# Patient Record
Sex: FEMALE | Race: WHITE | NOT HISPANIC OR LATINO
[De-identification: names, ages, dates, MRNs, and addresses within clinical notes are randomized per-mention and may not be internally consistent; named-entity substitution may affect disease eponyms.]

---

## 2018-09-25 ENCOUNTER — RESULT REVIEW (OUTPATIENT)
Age: 58
End: 2018-09-25

## 2018-11-30 ENCOUNTER — OUTPATIENT (OUTPATIENT)
Dept: OUTPATIENT SERVICES | Facility: HOSPITAL | Age: 58
LOS: 1 days | End: 2018-11-30
Payer: COMMERCIAL

## 2018-11-30 VITALS
RESPIRATION RATE: 16 BRPM | SYSTOLIC BLOOD PRESSURE: 113 MMHG | DIASTOLIC BLOOD PRESSURE: 76 MMHG | OXYGEN SATURATION: 97 % | HEIGHT: 60 IN | HEART RATE: 91 BPM | WEIGHT: 173.06 LBS | TEMPERATURE: 99 F

## 2018-11-30 DIAGNOSIS — N84.0 POLYP OF CORPUS UTERI: ICD-10-CM

## 2018-11-30 DIAGNOSIS — Z90.710 ACQUIRED ABSENCE OF BOTH CERVIX AND UTERUS: Chronic | ICD-10-CM

## 2018-11-30 DIAGNOSIS — Z01.818 ENCOUNTER FOR OTHER PREPROCEDURAL EXAMINATION: ICD-10-CM

## 2018-11-30 LAB
ANION GAP SERPL CALC-SCNC: 14 MMOL/L — SIGNIFICANT CHANGE UP (ref 5–17)
BUN SERPL-MCNC: 16 MG/DL — SIGNIFICANT CHANGE UP (ref 7–23)
CALCIUM SERPL-MCNC: 9.9 MG/DL — SIGNIFICANT CHANGE UP (ref 8.4–10.5)
CHLORIDE SERPL-SCNC: 103 MMOL/L — SIGNIFICANT CHANGE UP (ref 96–108)
CO2 SERPL-SCNC: 26 MMOL/L — SIGNIFICANT CHANGE UP (ref 22–31)
CREAT SERPL-MCNC: 0.87 MG/DL — SIGNIFICANT CHANGE UP (ref 0.5–1.3)
GLUCOSE SERPL-MCNC: 80 MG/DL — SIGNIFICANT CHANGE UP (ref 70–99)
HCT VFR BLD CALC: 43.7 % — SIGNIFICANT CHANGE UP (ref 34.5–45)
HGB BLD-MCNC: 14 G/DL — SIGNIFICANT CHANGE UP (ref 11.5–15.5)
MCHC RBC-ENTMCNC: 27.8 PG — SIGNIFICANT CHANGE UP (ref 27–34)
MCHC RBC-ENTMCNC: 32 GM/DL — SIGNIFICANT CHANGE UP (ref 32–36)
MCV RBC AUTO: 86.9 FL — SIGNIFICANT CHANGE UP (ref 80–100)
PLATELET # BLD AUTO: 326 K/UL — SIGNIFICANT CHANGE UP (ref 150–400)
POTASSIUM SERPL-MCNC: 4 MMOL/L — SIGNIFICANT CHANGE UP (ref 3.5–5.3)
POTASSIUM SERPL-SCNC: 4 MMOL/L — SIGNIFICANT CHANGE UP (ref 3.5–5.3)
RBC # BLD: 5.03 M/UL — SIGNIFICANT CHANGE UP (ref 3.8–5.2)
RBC # FLD: 14 % — SIGNIFICANT CHANGE UP (ref 10.3–14.5)
SODIUM SERPL-SCNC: 143 MMOL/L — SIGNIFICANT CHANGE UP (ref 135–145)
WBC # BLD: 10.68 K/UL — HIGH (ref 3.8–10.5)
WBC # FLD AUTO: 10.68 K/UL — HIGH (ref 3.8–10.5)

## 2018-11-30 PROCEDURE — 85027 COMPLETE CBC AUTOMATED: CPT

## 2018-11-30 PROCEDURE — 80048 BASIC METABOLIC PNL TOTAL CA: CPT

## 2018-11-30 PROCEDURE — G0463: CPT

## 2018-11-30 RX ORDER — SODIUM CHLORIDE 9 MG/ML
3 INJECTION INTRAMUSCULAR; INTRAVENOUS; SUBCUTANEOUS EVERY 8 HOURS
Qty: 0 | Refills: 0 | Status: DISCONTINUED | OUTPATIENT
Start: 2018-12-05 | End: 2018-12-20

## 2018-11-30 RX ORDER — LIDOCAINE HCL 20 MG/ML
0.2 VIAL (ML) INJECTION ONCE
Qty: 0 | Refills: 0 | Status: DISCONTINUED | OUTPATIENT
Start: 2018-12-05 | End: 2018-12-20

## 2018-11-30 NOTE — H&P PST ADULT - PSH
H/O  section    S/P bilateral breast reduction  2007 H/O  section    S/P bilateral breast reduction  2007  S/P MAMI-BSO  2012

## 2018-11-30 NOTE — H&P PST ADULT - NSANTHOSAYNRD_GEN_A_CORE
No. FREDERICK screening performed.  STOP BANG Legend: 0-2 = LOW Risk; 3-4 = INTERMEDIATE Risk; 5-8 = HIGH Risk

## 2018-11-30 NOTE — H&P PST ADULT - REASON FOR ADMISSION
"I am having my ovaries removed prophylactically" " I had episodes of postmenopausal bleeding. I have a Polyp in my Uterus"

## 2018-11-30 NOTE — H&P PST ADULT - PMH
ACL tear  current left. No surgery  Asthma    Bilateral ovarian cysts  ' 12  surgically treated  Corneal abrasion  left 2000  Depression    Environmental Allergies  dust, cats, dogs, mold, trees  Herniated disc  cervical and lumbarsacral ACL tear  current left. No surgery  Asthma    Bilateral ovarian cysts  ' 12  surgically treated  Corneal abrasion  left 2000  Depression  medically managed. No hospitalizations  Environmental Allergies  dust, cats, dogs, mold, trees  Herniated disc  cervical and lumbarsacral  Leukocytosis  secondary to previous long-term sterod use for treatment Asthma and Lumbar pain.

## 2018-11-30 NOTE — H&P PST ADULT - HISTORY OF PRESENT ILLNESS
52 year old female with a history of bilateral ovarian cysts and a positive family history of ovarian cancer presents for laparoscopic BSO, Possible Exploratory Laparotomy, Possible MAMI-BSO, Possible Node Dissection/Staging. This is a 59 y/o female with PMH: family h/o Ovarian Cyst: s/p ( '12): prophylactic MAMI/BSO (benign), mild Asthma: Inhaler prn,

## 2018-11-30 NOTE — H&P PST ADULT - ATTENDING COMMENTS
Pt seen by me. Updated history to include allergy to surgical tape. Otherwise no changes. Cytotec placed only 2 hours ago. Pt here for EUA, hysteroscopy, D&C, cervical polypectomy and possible endometrial polypectomy after failed attempt at  SHG in office.  Risks reviewed including but not limited to bleeding, infection, injury to surrounding organs (cervix, vagina, uterus, tubes, ovaries, bowel, bladder), injury to blood vessels and nerves that may result in permanent loss of function, incomplete removal of polyp, need for repeat procedure and need for additional procedure up to and including a hysterectomy.  Pt expressed understanding and consents were reviewed and signed.     MIGUEL Michael MD   [phone]

## 2018-12-04 ENCOUNTER — TRANSCRIPTION ENCOUNTER (OUTPATIENT)
Age: 58
End: 2018-12-04

## 2018-12-05 ENCOUNTER — RESULT REVIEW (OUTPATIENT)
Age: 58
End: 2018-12-05

## 2018-12-05 ENCOUNTER — OUTPATIENT (OUTPATIENT)
Dept: OUTPATIENT SERVICES | Facility: HOSPITAL | Age: 58
LOS: 1 days | End: 2018-12-05
Payer: COMMERCIAL

## 2018-12-05 VITALS
OXYGEN SATURATION: 96 % | HEART RATE: 84 BPM | RESPIRATION RATE: 18 BRPM | DIASTOLIC BLOOD PRESSURE: 62 MMHG | TEMPERATURE: 98 F | SYSTOLIC BLOOD PRESSURE: 108 MMHG

## 2018-12-05 VITALS
WEIGHT: 173.06 LBS | RESPIRATION RATE: 16 BRPM | SYSTOLIC BLOOD PRESSURE: 118 MMHG | DIASTOLIC BLOOD PRESSURE: 76 MMHG | TEMPERATURE: 99 F | HEART RATE: 91 BPM | OXYGEN SATURATION: 97 % | HEIGHT: 60 IN

## 2018-12-05 DIAGNOSIS — Z90.710 ACQUIRED ABSENCE OF BOTH CERVIX AND UTERUS: Chronic | ICD-10-CM

## 2018-12-05 DIAGNOSIS — N84.0 POLYP OF CORPUS UTERI: ICD-10-CM

## 2018-12-05 PROCEDURE — 88305 TISSUE EXAM BY PATHOLOGIST: CPT

## 2018-12-05 PROCEDURE — 58558 HYSTEROSCOPY BIOPSY: CPT

## 2018-12-05 PROCEDURE — 88305 TISSUE EXAM BY PATHOLOGIST: CPT | Mod: 26

## 2018-12-05 RX ORDER — CELECOXIB 200 MG/1
200 CAPSULE ORAL ONCE
Qty: 0 | Refills: 0 | Status: COMPLETED | OUTPATIENT
Start: 2018-12-05 | End: 2018-12-05

## 2018-12-05 RX ORDER — OXYCODONE HYDROCHLORIDE 5 MG/1
5 TABLET ORAL ONCE
Qty: 0 | Refills: 0 | Status: DISCONTINUED | OUTPATIENT
Start: 2018-12-05 | End: 2018-12-05

## 2018-12-05 RX ORDER — ONDANSETRON 8 MG/1
4 TABLET, FILM COATED ORAL ONCE
Qty: 0 | Refills: 0 | Status: COMPLETED | OUTPATIENT
Start: 2018-12-05 | End: 2018-12-05

## 2018-12-05 RX ORDER — SODIUM CHLORIDE 9 MG/ML
1000 INJECTION, SOLUTION INTRAVENOUS
Qty: 0 | Refills: 0 | Status: DISCONTINUED | OUTPATIENT
Start: 2018-12-05 | End: 2018-12-20

## 2018-12-05 RX ORDER — ACETAMINOPHEN 500 MG
1000 TABLET ORAL ONCE
Qty: 0 | Refills: 0 | Status: COMPLETED | OUTPATIENT
Start: 2018-12-05 | End: 2018-12-05

## 2018-12-05 RX ORDER — APREPITANT 80 MG/1
40 CAPSULE ORAL ONCE
Qty: 0 | Refills: 0 | Status: COMPLETED | OUTPATIENT
Start: 2018-12-05 | End: 2018-12-05

## 2018-12-05 RX ADMIN — ONDANSETRON 4 MILLIGRAM(S): 8 TABLET, FILM COATED ORAL at 08:31

## 2018-12-05 RX ADMIN — CELECOXIB 200 MILLIGRAM(S): 200 CAPSULE ORAL at 06:22

## 2018-12-05 RX ADMIN — APREPITANT 40 MILLIGRAM(S): 80 CAPSULE ORAL at 06:22

## 2018-12-05 RX ADMIN — CELECOXIB 200 MILLIGRAM(S): 200 CAPSULE ORAL at 08:30

## 2018-12-05 RX ADMIN — Medication 1000 MILLIGRAM(S): at 06:22

## 2018-12-05 RX ADMIN — OXYCODONE HYDROCHLORIDE 5 MILLIGRAM(S): 5 TABLET ORAL at 08:30

## 2018-12-05 NOTE — ASU DISCHARGE PLAN (ADULT/PEDIATRIC). - NOTIFY
Unable to Urinate/Excessive Diarrhea/Inability to Tolerate Liquids or Foods/GYN Fever>100.4/Pain not relieved by Medications/Persistent Nausea and Vomiting/Bleeding that does not stop

## 2018-12-05 NOTE — ASU DISCHARGE PLAN (ADULT/PEDIATRIC). - ACTIVITY LEVEL
nothing per vagina/no intercourse/no sports/gym/no douching/no exercise/no heavy lifting/no tub baths/nothing per rectum/no tampons

## 2018-12-05 NOTE — ASU PATIENT PROFILE, ADULT - PMH
ACL tear  current left. No surgery  Asthma    Bilateral ovarian cysts  ' 12  surgically treated  Corneal abrasion  left 2000  Depression  medically managed. No hospitalizations  Environmental Allergies  dust, cats, dogs, mold, trees  Herniated disc  cervical and lumbarsacral  Leukocytosis  secondary to previous long-term sterod use for treatment Asthma and Lumbar pain.

## 2018-12-05 NOTE — BRIEF OPERATIVE NOTE - PROCEDURE
<<-----Click on this checkbox to enter Procedure Hysteroscopy with dilation and curettage of uterus, endometrial biopsy, and polypectomy  12/05/2018  With [facility]  Active  [id number]

## 2018-12-05 NOTE — ASU DISCHARGE PLAN (ADULT/PEDIATRIC). - SPECIAL INSTRUCTIONS
Please take tylenol 650mg by mouth as needed for pain every 6 hours alternating with motrin 600mg by mouth with food every 6 hours as needed for pain.

## 2021-03-31 ENCOUNTER — EMERGENCY (EMERGENCY)
Facility: HOSPITAL | Age: 61
LOS: 1 days | Discharge: HOME | End: 2021-03-31
Attending: EMERGENCY MEDICINE | Admitting: EMERGENCY MEDICINE
Payer: COMMERCIAL

## 2021-03-31 VITALS
RESPIRATION RATE: 14 BRPM | SYSTOLIC BLOOD PRESSURE: 132 MMHG | HEART RATE: 75 BPM | OXYGEN SATURATION: 98 % | DIASTOLIC BLOOD PRESSURE: 83 MMHG | TEMPERATURE: 98 F

## 2021-03-31 VITALS
HEIGHT: 60 IN | HEART RATE: 87 BPM | OXYGEN SATURATION: 97 % | SYSTOLIC BLOOD PRESSURE: 132 MMHG | WEIGHT: 167.99 LBS | TEMPERATURE: 98 F | DIASTOLIC BLOOD PRESSURE: 80 MMHG | RESPIRATION RATE: 16 BRPM

## 2021-03-31 DIAGNOSIS — Z90.710 ACQUIRED ABSENCE OF BOTH CERVIX AND UTERUS: Chronic | ICD-10-CM

## 2021-03-31 PROBLEM — D72.829 ELEVATED WHITE BLOOD CELL COUNT, UNSPECIFIED: Chronic | Status: ACTIVE | Noted: 2018-11-30

## 2021-03-31 PROCEDURE — 96374 THER/PROPH/DIAG INJ IV PUSH: CPT

## 2021-03-31 PROCEDURE — 99284 EMERGENCY DEPT VISIT MOD MDM: CPT | Mod: 25

## 2021-03-31 PROCEDURE — 99284 EMERGENCY DEPT VISIT MOD MDM: CPT

## 2021-03-31 PROCEDURE — 96375 TX/PRO/DX INJ NEW DRUG ADDON: CPT

## 2021-03-31 RX ORDER — SERTRALINE 25 MG/1
400 TABLET, FILM COATED ORAL
Qty: 0 | Refills: 0 | DISCHARGE

## 2021-03-31 RX ORDER — IBUPROFEN 200 MG
600 TABLET ORAL
Qty: 0 | Refills: 0 | DISCHARGE

## 2021-03-31 RX ORDER — KETOROLAC TROMETHAMINE 30 MG/ML
30 SYRINGE (ML) INJECTION ONCE
Refills: 0 | Status: DISCONTINUED | OUTPATIENT
Start: 2021-03-31 | End: 2021-03-31

## 2021-03-31 RX ORDER — SODIUM CHLORIDE 9 MG/ML
1000 INJECTION INTRAMUSCULAR; INTRAVENOUS; SUBCUTANEOUS ONCE
Refills: 0 | Status: COMPLETED | OUTPATIENT
Start: 2021-03-31 | End: 2021-03-31

## 2021-03-31 RX ORDER — ALBUTEROL 90 UG/1
0.5 AEROSOL, METERED ORAL
Qty: 0 | Refills: 0 | DISCHARGE

## 2021-03-31 RX ORDER — METOCLOPRAMIDE HCL 10 MG
10 TABLET ORAL ONCE
Refills: 0 | Status: COMPLETED | OUTPATIENT
Start: 2021-03-31 | End: 2021-03-31

## 2021-03-31 RX ORDER — MONTELUKAST 4 MG/1
1 TABLET, CHEWABLE ORAL
Qty: 0 | Refills: 0 | DISCHARGE

## 2021-03-31 RX ADMIN — Medication 10 MILLIGRAM(S): at 05:45

## 2021-03-31 RX ADMIN — Medication 30 MILLIGRAM(S): at 05:45

## 2021-03-31 RX ADMIN — SODIUM CHLORIDE 1000 MILLILITER(S): 9 INJECTION INTRAMUSCULAR; INTRAVENOUS; SUBCUTANEOUS at 06:46

## 2021-03-31 RX ADMIN — Medication 125 MILLIGRAM(S): at 05:45

## 2021-03-31 RX ADMIN — SODIUM CHLORIDE 1000 MILLILITER(S): 9 INJECTION INTRAMUSCULAR; INTRAVENOUS; SUBCUTANEOUS at 05:46

## 2021-03-31 RX ADMIN — Medication 30 MILLIGRAM(S): at 06:10

## 2021-03-31 NOTE — ED PROVIDER NOTE - OBJECTIVE STATEMENT
60yo female who presents with migraine for 2 days. pt c/o headache, with photophobia and vomiting, hx of migraines, pt states excedrin works but she started to vomit so she was afraid to take anything for the headache, no fever, chills, no blurry or double vision, no other complaints

## 2021-03-31 NOTE — ED PROVIDER NOTE - PROGRESS NOTE DETAILS
pt reevaluted, feeling better, headache has improved, pt to be d/c home follow up with pmd, return if any symtpoms worsen

## 2021-03-31 NOTE — ED ADULT NURSE NOTE - OBJECTIVE STATEMENT
pt walked in c/o headache, nausea and vomiting x2 days, hx migraine, also had diarrhea which subsided now.

## 2021-03-31 NOTE — ED ADULT TRIAGE NOTE - CHIEF COMPLAINT QUOTE
'I have very bad migraine, vomiting and diarrhea, four days ago I ate a piece if raw chicken by accident'.

## 2021-03-31 NOTE — ED PROVIDER NOTE - PATIENT PORTAL LINK FT
You can access the FollowMyHealth Patient Portal offered by [facility] by registering at the following website: [email or web address]. By joining [employer] [facility]’s FollowMyHealth portal, you will also be able to view your health information using other applications (apps) compatible with our system.

## 2021-06-06 ENCOUNTER — EMERGENCY (EMERGENCY)
Facility: HOSPITAL | Age: 61
LOS: 1 days | Discharge: HOME | End: 2021-06-06
Attending: EMERGENCY MEDICINE | Admitting: EMERGENCY MEDICINE
Payer: COMMERCIAL

## 2021-06-06 VITALS
DIASTOLIC BLOOD PRESSURE: 73 MMHG | SYSTOLIC BLOOD PRESSURE: 117 MMHG | TEMPERATURE: 98 F | HEART RATE: 94 BPM | RESPIRATION RATE: 19 BRPM | OXYGEN SATURATION: 97 %

## 2021-06-06 VITALS
TEMPERATURE: 98 F | RESPIRATION RATE: 16 BRPM | HEART RATE: 87 BPM | OXYGEN SATURATION: 97 % | WEIGHT: 167.99 LBS | HEIGHT: 60 IN | SYSTOLIC BLOOD PRESSURE: 131 MMHG | DIASTOLIC BLOOD PRESSURE: 84 MMHG

## 2021-06-06 DIAGNOSIS — Z90.710 ACQUIRED ABSENCE OF BOTH CERVIX AND UTERUS: Chronic | ICD-10-CM

## 2021-06-06 DIAGNOSIS — F43.25 ADJUSTMENT DISORDER WITH MIXED DISTURBANCE OF EMOTIONS AND CONDUCT: ICD-10-CM

## 2021-06-06 DIAGNOSIS — F33.41 MAJOR DEPRESSIVE DISORDER, RECURRENT, IN PARTIAL REMISSION: ICD-10-CM

## 2021-06-06 LAB
ALBUMIN SERPL ELPH-MCNC: 3.7 G/DL — SIGNIFICANT CHANGE UP (ref 3.3–5)
ALP SERPL-CCNC: 77 U/L — SIGNIFICANT CHANGE UP (ref 30–120)
ALT FLD-CCNC: 26 U/L DA — SIGNIFICANT CHANGE UP (ref 10–60)
ANION GAP SERPL CALC-SCNC: 10 MMOL/L — SIGNIFICANT CHANGE UP (ref 5–17)
APAP SERPL-MCNC: <1 UG/ML — LOW (ref 10–30)
AST SERPL-CCNC: 15 U/L — SIGNIFICANT CHANGE UP (ref 10–40)
BASOPHILS # BLD AUTO: 0.03 K/UL — SIGNIFICANT CHANGE UP (ref 0–0.2)
BASOPHILS NFR BLD AUTO: 0.3 % — SIGNIFICANT CHANGE UP (ref 0–2)
BILIRUB SERPL-MCNC: 0.3 MG/DL — SIGNIFICANT CHANGE UP (ref 0.2–1.2)
BUN SERPL-MCNC: 14 MG/DL — SIGNIFICANT CHANGE UP (ref 7–23)
CALCIUM SERPL-MCNC: 8.9 MG/DL — SIGNIFICANT CHANGE UP (ref 8.4–10.5)
CHLORIDE SERPL-SCNC: 107 MMOL/L — SIGNIFICANT CHANGE UP (ref 96–108)
CO2 SERPL-SCNC: 25 MMOL/L — SIGNIFICANT CHANGE UP (ref 22–31)
COVID-19 SPIKE DOMAIN AB INTERP: POSITIVE
COVID-19 SPIKE DOMAIN ANTIBODY RESULT: >250 U/ML — HIGH
CREAT SERPL-MCNC: 0.77 MG/DL — SIGNIFICANT CHANGE UP (ref 0.5–1.3)
EOSINOPHIL # BLD AUTO: 0.15 K/UL — SIGNIFICANT CHANGE UP (ref 0–0.5)
EOSINOPHIL NFR BLD AUTO: 1.6 % — SIGNIFICANT CHANGE UP (ref 0–6)
ETHANOL SERPL-MCNC: 48 MG/DL — HIGH (ref 0–3)
GLUCOSE SERPL-MCNC: 116 MG/DL — HIGH (ref 70–99)
HCT VFR BLD CALC: 40.5 % — SIGNIFICANT CHANGE UP (ref 34.5–45)
HGB BLD-MCNC: 13.5 G/DL — SIGNIFICANT CHANGE UP (ref 11.5–15.5)
IMM GRANULOCYTES NFR BLD AUTO: 0.1 % — SIGNIFICANT CHANGE UP (ref 0–1.5)
LYMPHOCYTES # BLD AUTO: 3.1 K/UL — SIGNIFICANT CHANGE UP (ref 1–3.3)
LYMPHOCYTES # BLD AUTO: 32.9 % — SIGNIFICANT CHANGE UP (ref 13–44)
MCHC RBC-ENTMCNC: 28.5 PG — SIGNIFICANT CHANGE UP (ref 27–34)
MCHC RBC-ENTMCNC: 33.3 GM/DL — SIGNIFICANT CHANGE UP (ref 32–36)
MCV RBC AUTO: 85.6 FL — SIGNIFICANT CHANGE UP (ref 80–100)
MONOCYTES # BLD AUTO: 0.59 K/UL — SIGNIFICANT CHANGE UP (ref 0–0.9)
MONOCYTES NFR BLD AUTO: 6.3 % — SIGNIFICANT CHANGE UP (ref 2–14)
NEUTROPHILS # BLD AUTO: 5.53 K/UL — SIGNIFICANT CHANGE UP (ref 1.8–7.4)
NEUTROPHILS NFR BLD AUTO: 58.8 % — SIGNIFICANT CHANGE UP (ref 43–77)
NRBC # BLD: 0 /100 WBCS — SIGNIFICANT CHANGE UP (ref 0–0)
PCP SPEC-MCNC: SIGNIFICANT CHANGE UP
PLATELET # BLD AUTO: 287 K/UL — SIGNIFICANT CHANGE UP (ref 150–400)
POTASSIUM SERPL-MCNC: 3.7 MMOL/L — SIGNIFICANT CHANGE UP (ref 3.5–5.3)
POTASSIUM SERPL-SCNC: 3.7 MMOL/L — SIGNIFICANT CHANGE UP (ref 3.5–5.3)
PROT SERPL-MCNC: 7.1 G/DL — SIGNIFICANT CHANGE UP (ref 6–8.3)
RBC # BLD: 4.73 M/UL — SIGNIFICANT CHANGE UP (ref 3.8–5.2)
RBC # FLD: 13.4 % — SIGNIFICANT CHANGE UP (ref 10.3–14.5)
SALICYLATES SERPL-MCNC: 1.8 MG/DL — LOW (ref 2.8–20)
SARS-COV-2 IGG+IGM SERPL QL IA: >250 U/ML — HIGH
SARS-COV-2 IGG+IGM SERPL QL IA: POSITIVE
SARS-COV-2 RNA SPEC QL NAA+PROBE: SIGNIFICANT CHANGE UP
SODIUM SERPL-SCNC: 142 MMOL/L — SIGNIFICANT CHANGE UP (ref 135–145)
WBC # BLD: 9.41 K/UL — SIGNIFICANT CHANGE UP (ref 3.8–10.5)
WBC # FLD AUTO: 9.41 K/UL — SIGNIFICANT CHANGE UP (ref 3.8–10.5)

## 2021-06-06 PROCEDURE — 93010 ELECTROCARDIOGRAM REPORT: CPT

## 2021-06-06 PROCEDURE — U0003: CPT

## 2021-06-06 PROCEDURE — 80307 DRUG TEST PRSMV CHEM ANLYZR: CPT

## 2021-06-06 PROCEDURE — 99285 EMERGENCY DEPT VISIT HI MDM: CPT

## 2021-06-06 PROCEDURE — 80053 COMPREHEN METABOLIC PANEL: CPT

## 2021-06-06 PROCEDURE — 36415 COLL VENOUS BLD VENIPUNCTURE: CPT

## 2021-06-06 PROCEDURE — 87635 SARS-COV-2 COVID-19 AMP PRB: CPT

## 2021-06-06 PROCEDURE — 93005 ELECTROCARDIOGRAM TRACING: CPT

## 2021-06-06 PROCEDURE — 85025 COMPLETE CBC W/AUTO DIFF WBC: CPT

## 2021-06-06 PROCEDURE — 90792 PSYCH DIAG EVAL W/MED SRVCS: CPT | Mod: 95

## 2021-06-06 PROCEDURE — 86769 SARS-COV-2 COVID-19 ANTIBODY: CPT

## 2021-06-06 NOTE — ED BEHAVIORAL HEALTH ASSESSMENT NOTE - MEDICATIONS (PRESCRIPTIONS, DIRECTIONS)
continue home medications (Abilify and Zoloft); discussed need to throw out Xanax and discuss this with her psychiatrist

## 2021-06-06 NOTE — ED ADULT TRIAGE NOTE - CHIEF COMPLAINT QUOTE
" I was very upset . I took 2 tablets of Xanax and 2 glasses of vodka " Pt denies any suicidal ideation No homicidal ideation

## 2021-06-06 NOTE — ED BEHAVIORAL HEALTH ASSESSMENT NOTE - OTHER PAST PSYCHIATRIC HISTORY (INCLUDE DETAILS REGARDING ONSET, COURSE OF ILLNESS, INPATIENT/OUTPATIENT TREATMENT)
Reports history of outpatient psychiatric treatment for the past 5 years with Dr. Brenna Arias; prescribed Zoloft 200 mg daily and Abilify (2 or 5 mg daily - does not remember the dose); also in therapy with psychologist Dr. Esau Rodriguez

## 2021-06-06 NOTE — ED BEHAVIORAL HEALTH ASSESSMENT NOTE - ADDITIONAL DETAILS ALL
patient reports remote passive SI about 3 years ago when her father  but denies any active suicidal ideation, suicidal intent or plan; Denies any lifetime history of SA or NSSIB

## 2021-06-06 NOTE — ED BEHAVIORAL HEALTH ASSESSMENT NOTE - AXIS III
migraines, asthma, history of herniated discs, breast reduction surgery, colon polyp removal, h/o salpingo-oophorectomy

## 2021-06-06 NOTE — ED BEHAVIORAL HEALTH ASSESSMENT NOTE - NSSUICPROTFACT_PSY_ALL_CORE
Responsibility to children, family, or others/Identifies reasons for living/Supportive social network of family or friends/Fear of death or the actual act of killing self/Positive therapeutic relationships

## 2021-06-06 NOTE — ED ADULT NURSE NOTE - NSIMPLEMENTINTERV_GEN_ALL_ED
Implemented All Fall with Harm Risk Interventions:  [place] to call system. Call bell, personal items and telephone within reach. Instruct patient to call for assistance. Room bathroom lighting operational. Non-slip footwear when patient is off stretcher. Physically safe environment: no spills, clutter or unnecessary equipment. Stretcher in lowest position, wheels locked, appropriate side rails in place. Provide visual cue, wrist band, yellow gown, etc. Monitor gait and stability. Monitor for mental status changes and reorient to person, place, and time. Review medications for side effects contributing to fall risk. Reinforce activity limits and safety measures with patient and family. Provide visual clues: red socks.

## 2021-06-06 NOTE — ED BEHAVIORAL HEALTH ASSESSMENT NOTE - DETAILS
mother - suspected ?Borderline Personality Disorder patient denies any suicidal ideation, intent or plan in the last month; denies any SA or NSSIB 22 y/o daughter, lives with patient and patient's  n/a see Safety Plan note for details

## 2021-06-06 NOTE — ED BEHAVIORAL HEALTH ASSESSMENT NOTE - EYE CONTACT
PHYSICAL EXAMINATION:  Vital Signs Last 24 Hrs  T(C): 36.6 (02 Apr 2020 18:15), Max: 36.9 (02 Apr 2020 11:15)  T(F): 97.8 (02 Apr 2020 18:15), Max: 98.5 (02 Apr 2020 11:15)  HR: 78 (02 Apr 2020 18:15) (74 - 82)  BP: 141/77 (02 Apr 2020 18:15) (112/75 - 141/77)  BP(mean): --  RR: 18 (02 Apr 2020 18:15) (18 - 20)  SpO2: 93% (02 Apr 2020 18:15) (90% - 97%)  CAPILLARY BLOOD GLUCOSE      POCT Blood Glucose.: 138 mg/dL (02 Apr 2020 12:50)      GENERAL: NAD, well-groomed, well-developed  HEAD:  atraumatic, normocephalic  EYES: sclera anicteric  ENMT: mucous membranes moist  NECK: supple, No JVD  CHEST/LUNG: clear to auscultation bilaterally; no rales, rhonchi, or wheezing b/l  HEART: normal S1, S2  ABDOMEN: BS+, soft, ND, NT   EXTREMITIES:  2+ bipedal edema   NEURO: awake, alert, interactive; moves all extremities  SKIN: no rashes or lesions Good

## 2021-06-06 NOTE — ED BEHAVIORAL HEALTH ASSESSMENT NOTE - DIFFERENTIAL
Adjustment Disorder versus MDD; Cluster B traits concerning for Borderline Personality Disorder is also on the differential

## 2021-06-06 NOTE — ED PROVIDER NOTE - PHYSICAL EXAMINATION
Constitutional: Awake, Alert, non-toxic. NAD. Well appearing, well nourished.   HEAD: Normocephalic, atraumatic.   EYES: PERRL, EOM intact, conjunctiva and sclera are clear bilaterally. No raccoon eyes.   ENT: No rhinorrhea, normal pharynx, patent, no tonsillar exudate or enlargement, mucous membranes pink/moist, no erythema, no drooling or stridor.   NECK: Supple, non-tender  CARDIOVASCULAR: Normal S1, S2; regular rate and rhythm.  RESPIRATORY: Normal respiratory effort; breath sounds CTAB, no wheezes, rhonchi, or rales. Speaking in full sentences. No accessory muscle use.   ABDOMEN: Soft; non-tender, non-distended.   EXTREMITIES: Full passive and active ROM in all extremities; non-tender to palpation; distal pulses palpable and symmetric, no edema, no crepitus or step off  SKIN: Warm, dry; good skin turgor, no apparent lesions or rashes, no ecchymosis, brisk capillary refill.  NEURO: A&O x3. Sensory and motor functions are grossly intact. Speech is normal. Appearance and judgement seem appropriate for gender and age. No neurological deficits. Neurovascular sensation intact motor function 5/5 of upper and lower extremities, CN II-XII grossly intact, no ataxia, absent pronator drift, intact cerebellar function. Speech clear, without articulation or word-finding difficulties. Eyes- PERRL bilaterally. EOMs in tact. No nystagmus. No facial droop.

## 2021-06-06 NOTE — ED BEHAVIORAL HEALTH ASSESSMENT NOTE - SUMMARY
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 Current presentation is most consistent with diagnoses of Adjustment Disorder versus MDD and concern for underlying cluster B traits (some difficulties with overly dramatic, emotionally reactive behavior, impulsivity, anger, etc.). Patient is not an imminent risk of harm to self or others and will therefore be discharged with plan to follow up with outpatient therapist and psychiatrist.

## 2021-06-06 NOTE — ED ADULT NURSE NOTE - OBJECTIVE STATEMENT
patient took vodka and 2 pills of xanax because patient was stressed, patient denied SI but stated "I don't want to deal with it any more" patient stated her  has problem with her daughter's boyfriend,  IV accessed and tolerating. Labs drawn & sent results pending. will continue to monitor.

## 2021-06-06 NOTE — ED BEHAVIORAL HEALTH ASSESSMENT NOTE - SAFETY PLAN ADDT'L DETAILS
Safety plan discussed with.../Education provided regarding environmental safety / lethal means restriction/Provision of National Suicide Prevention Lifeline 2-373-212-UESY (4115)
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
## 2021-06-06 NOTE — ED PROVIDER NOTE - NSFOLLOWUPCLINICS_GEN_ALL_ED_FT
[facility]  Psychiatry  [id number] [place] [address]  Phone: [phone]  Fax:   Follow Up Time: 1-3 Days

## 2021-06-06 NOTE — ED BEHAVIORAL HEALTH ASSESSMENT NOTE - CURRENT MEDICATION
Zoloft 200 mg daily and Abilify (2 or 5 mg daily - does not remember the dose); Qvar inhaler; Cambia dose known PRN migraines

## 2021-06-06 NOTE — ED BEHAVIORAL HEALTH ASSESSMENT NOTE - DESCRIPTION
As per BTCM note. Patient has been calm and cooperative in ED and during telepsychiatry examination. mirgaines, asthma, history of herniated discs, breast reduction surgery, colon polyp removal, h/o salpingo-oophorectomy  for 23 years to ; has one 21 year-old daughter who is in college (now home for the summer); worked as a  but now retired migraines, asthma, history of herniated discs, breast reduction surgery, colon polyp removal, h/o salpingo-oophorectomy

## 2021-06-06 NOTE — ED PROVIDER NOTE - PATIENT PORTAL LINK FT
You can access the FollowMyHealth Patient Portal offered by [facility] by registering at the following website: [email or web address]. By joining [employer]’s FollowMyHealth portal, you will also be able to view your health information using other applications (apps) compatible with our system.

## 2021-06-06 NOTE — ED PROVIDER NOTE - CLINICAL SUMMARY MEDICAL DECISION MAKING FREE TEXT BOX
BIBA from home for psychiatric evaluation. Pt reports she has been feeling sad today because her  will not allow her daughters boyfriend to enter their house though he is a good kid. Reports she drank two glasses of alcohol and took two Xanax. pt denies being suicidal. pt  not concerned for suicide but threatened to harm herself with knife in the past. plan includes labs, EKG, drug screening, alcohol, psych consult.

## 2021-06-06 NOTE — ED PROVIDER NOTE - PROGRESS NOTE DETAILS
Kayleigh PECK for Dr. Rodrigues: 62 y/o female BIBEMS for depression and overdose. Pt relates chronic depression since she had her daughter 21 years ago. Pt has been stable but has worsened approx 2 years ago since her father . Reports Dr. Arias added PRN Xanax to medication regimen. Pt relates past year with increased depression, relates her  doesn't allow daughter's boyfriend in house yesterday. Pt's daughter asked again if her boyfriend can go to the house. Pt's  refused which made pt increasingly upset. Relates this morning drank 2 glasses of vodka due to feeling upset and  came home pt took 2 xanax to "numb herself" so she wouldn't be upset by him anymore. Pt relates told  what she took and he called 91. No SI/HI. Denies HA, dizziness, N/V, CP, SOB, abd pain, weakness, numbness. Per , 1 month ago pt threatened to harm herself with a knife but did not make any threats today.   Physical Exam: WD, WN, NAD , PERRL, EOMI, MMM, S1S2, RRR, lungs cta, abd soft non-tender. Neuro: no motor sensory deficits. Psych: alert & oriented, memory intact, +depressed affect, denies thoughts of self harm. Kayleigh PECK for Dr. Rodrigues: 60 y/o female BIBEMS for depression and overdose. Pt relates chronic depression since she had her daughter 21 years ago. Pt has been stable but has worsened approx 2 years ago since her father . Reports Dr. Arias added PRN Xanax to medication regimen. Pt relates past year with increased depression, relates her  doesn't allow daughter's boyfriend in the house. Pt's daughter asked again if her boyfriend can go to the house. Pt's  refused which made pt increasingly upset. Relates this morning drank 2 glasses of vodka due to feeling upset and  came home pt took 2 xanax to "numb herself" so she wouldn't be upset by him anymore. Pt relates told  what she took and he called 91. No SI/HI. Denies HA, dizziness, N/V, CP, SOB, abd pain, weakness, numbness. Per , 1 month ago pt threatened to harm herself with a knife but did not make any threats today.   Physical Exam: WD, WN, NAD , PERRL, EOMI, MMM, S1S2, RRR, lungs cta, abd soft non-tender. Neuro: no motor sensory deficits. Psych: alert & oriented, memory intact, +depressed affect, denies thoughts of self harm. Kayleigh PECK for Dr. Rodrigues: 62 y/o female BIBEMS for depression and overdose. Pt relates chronic depression since she had her daughter 21 years ago. Pt has been stable but has worsened approx 2 years ago since her father . Reports Dr. Arias added PRN Xanax to medication regimen at that time. Pt relates past year with increased depression, partly related to fact that her  doesn't allow daughter's boyfriend in the house. yesterday pt's daughter asked again if her boyfriend can go to the house. Pt's  refused which made pt increasingly upset. Relates this morning drank 2 glasses of vodka due to feeling upset and  came home pt took 2 xanax to "numb herself" so she wouldn't be upset by him anymore. Pt relates told  what she took and he called 911. Pt denies SI/HI. Denies HA, dizziness, N/V, CP, SOB, abd pain, weakness, numbness. Per , 1 month ago pt threatened to harm herself with a knife but did not make any threats today.   Physical Exam: WD, WN, NAD , PERRL, EOMI, MMM, S1S2, RRR, lungs cta, abd soft non-tender. Neuro: no motor sensory deficits. Psych: alert & oriented, memory intact, +depressed affect, denies thoughts of self harm. Telepsych advised pt cleared for dc. pt and family have no safety concerns, agreed to dc. pt has appointment with therapist tomorrow.

## 2021-06-06 NOTE — ED BEHAVIORAL HEALTH ASSESSMENT NOTE - RISK ASSESSMENT
Keerthi is future-oriented (reports looking forward to taking a planned trip to [facility] in October 2021; discusses plans to go to her appointments this week). She completed a safety plan identifying warning signs and coping strategies she identified as being useful to her. Patient's , Reji Pierson, who activated EMS today and patient's daughter, Crystal Pierson, both of whom live with the patient, feel comfortable with Keerthi returning home at this time and with plan for Keerthi to continue treatment in outpatient care.     Although Keerthi remains at chronically elevated risk for impulsive behavior given past history and ongoing psychosocial stressors, she is at low imminent risk for harm to self or others at this time as she is adherent to treatment, future-oriented, help-seeking, calm, cooperative and in no acute distress at this time and identifies various reasons for wanting to live. She is currently at low acute risk and does not require inpatient psychiatric hospitalization at this time. She is currently clinically stable for outpatient treatment. Safety plan reviewed with patient as well as instructions to return to ED or call 911 if feeling unsafe. Low Acute Suicide Risk Keerthi is future-oriented (reports looking forward to taking a planned trip to [facility] in October 2021; discusses plans to go to her appointments this week). She completed a safety plan identifying warning signs and coping strategies she identified as being useful to her. Patient's , Reji Pierson, who activated EMS today and patient's daughter, Crystal Pierson, both of whom live with the patient, feel comfortable with Keerthi returning home at this time and with plan for Keerthi to continue treatment in outpatient care.     Patient and patient's  both deny any suicidality, suicidal intent or plan. Patient endorses frustrations with relationship with  and admits to trying to get attention by making threats she has no intention on carrying out. She reports strong future-orientation and planning, endorses enjoyment of various activities and interpersonal relationships, and identifies various reasons for living.    Although Keerthi remains at chronically elevated risk for impulsive behavior given past history and ongoing psychosocial stressors, she is at low imminent risk for harm to self or others at this time as she is adherent to treatment, future-oriented, help-seeking, calm, cooperative and in no acute distress at this time and identifies various reasons for wanting to live. She is currently at low acute risk and does not require inpatient psychiatric hospitalization at this time. She is currently clinically stable for outpatient treatment. Safety plan reviewed with patient as well as instructions to return to ED or call 911 if feeling unsafe.

## 2021-06-06 NOTE — ED PROVIDER NOTE - NSFOLLOWUPINSTRUCTIONS_ED_ALL_ED_FT
Return for any suicidal thoughts or intent to harm self. Return for any worsening condition.         Depression    WHAT YOU NEED TO KNOW:    Depression is a medical condition that causes feelings of sadness or hopelessness that do not go away. Depression may cause you to lose interest in things you used to enjoy. These feelings may interfere with your daily life.    DISCHARGE INSTRUCTIONS:    Call your local emergency number (911 in the ) if:   •You think about harming yourself or someone else.      •You have done something on purpose to hurt yourself.      Call your therapist or doctor if:   •Your symptoms do not improve.      •You cannot make it to your next appointment.       •You have new symptoms.      •You have questions or concerns about your condition or care.      The following resources are available at any time to help you, if needed:   •National Suicide Prevention Lifeline: 1-489.507.8709 (1-800-273-TALK)      •Suicide Hotline: 1-101.454.6305 (6-802-NQOXOMT)      •For a list of international numbers: https://save.org/find-help/international-resources/      Medicines:   •Antidepressants may be given to improve or balance your mood. You may need to take this medicine for several weeks before you begin to feel better.      •Take your medicine as directed. Contact your healthcare provider if you think your medicine is not helping or if you have side effects. Tell him of her if you are allergic to any medicine. Keep a list of the medicines, vitamins, and herbs you take. Include the amounts, and when and why you take them. Bring the list or the pill bottles to follow-up visits. Carry your medicine list with you in case of an emergency.      Therapy is often used together with medicine to relieve depression. Therapy is a way for you to talk about your feelings and anything that may be causing depression. Therapy can be done alone or in a group. It may also be done with family members or a significant other.    Self-care:   •Get regular physical activity. Try to be active for 30 minutes, 3 to 5 days a week. Physical activity can help relieve depression. Work with your healthcare provider to develop a plan that you enjoy. It may help to ask someone to be active with you.      •Create a regular sleep schedule. A routine can help you relax before bed. Listen to music, read, or do yoga. Try to go to bed and wake up at the same time every day. Sleep is important for emotional health.      •Eat a variety of healthy foods. Healthy foods include fruits, vegetables, whole-grain breads, low-fat dairy products, lean meats, fish, and cooked beans. A healthy meal plan is low in fat, salt, and added sugar.      •Do not drink alcohol or use drugs. Alcohol and drugs can make depression worse. Talk to your therapist or doctor if you need help quitting.      Follow up with your healthcare provider as directed: Your healthcare provider will monitor your progress at follow-up visits. He or she will also monitor your medicine if you take antidepressants. Your healthcare provider will ask if the medicine is helping. Tell him or her about any side effects or problems you may have with your medicine. The type or amount of medicine may need to be changed. Write down your questions so you remember to ask them during your visits.

## 2021-06-06 NOTE — ED BEHAVIORAL HEALTH ASSESSMENT NOTE - VIOLENCE PROTECTIVE FACTORS:
Residential stability/Relationship stability/Engagement in treatment/Good treatment response/compliance

## 2021-06-06 NOTE — ED BEHAVIORAL HEALTH NOTE - BEHAVIORAL HEALTH NOTE
[facility] left message for Dr. Arias, Psychiatrist at [phone] requesting call back.     [facility] left message for Dr. Rodriguez Psychotherapist at [phone] requesting callback.       ===================  PRE-HOSPITAL COURSE  ===================  SOURCE:  RN and triage documentation.   DETAILS:  Patient was BIBEMS; chief complaint of SI.   ============  ED COURSE   ============  SOURCE: RN and triage documentation.   ARRIVAL: Patient was cooperative with triage process and allowed for gowning/wanding without incident. Patient presents with fair hygiene/grooming. Patient gowned, wanded, placed in private room.   BELONGINGS: None notable.    BEHAVIOR: Patient’ has been calm and compliant while in ED. Patient has given blood/urine for routine labs without incident. Patient presently denies SI/HI/AH/VH, endorses social stressors at home. Patient’s speech of normal volume/ rate accompanied by a logical and linear thought process; patient is AOx4. Patient makes appropriate eye contact. Patient has been resting in hospital bed.   TREATMENT: Patient has not required medication intervention while in ED, has been in behavioral control.   VISITORS: Patient presently unaccompanied by social supports while in ED.     COVID Exposure Screen- collateral (i.e. third-party, chart review, belongings, etc; include EMS and ED staff)  1.	*Has the patient had a COVID-19 test in the last 90 days?  ( ) Yes   ( X) No   (  ) Unknown- Reason: ____  IF YES PROCEED TO QUESTION #2. IF NO OR UNKNOWN, PLEASE SKIP TO QUESTION #3.  2.	Date of test(s) and result(s): _____  3.	*Has the patient tested positive for COVID-19 antibodies? (  ) Yes   ( X) No   (  ) Unknown- Reason: ____  IF YES PROCEED TO QUESTION #4. IF NO or UNKNOWN, PLEASE SKIP TO QUESTION #5.  4.	Date of positive antibody test: _______  5.	*Has the patient received 2 doses of the COVID-19 vaccine? ( X) Yes   (  ) No   (  ) Unknown- Reason: ____  IF YES PROCEED TO QUESTION #6. IF NO or UNKNOWN, PLEASE SKIP TO QUESTION #7.  6.	 Date of second dose: ___March, both doses completed____  7.	*In the past 10 days, has the patient been around anyone with a positive COVID-19 test?* (  ) Yes   ( X) No   (  ) Unknown- Reason: _____  IF YES PROCEED TO QUESTION #8. IF NO or UNKNOWN, PLEASE SKIP TO QUESTION #13.  8.	Was the patient within 6 feet of them for at least 15 minutes? (  ) Yes   (  ) No   (  ) Unknown- Reason: _____  9.	Did the patient provide care for them? (  ) Yes   (  ) No   (  ) Unknown- Reason: ______  10.	Did the patient have direct physical contact with them (touched, hugged, or kissed them)? (  ) Yes   (  ) No    (  ) Unknown- Reason: _____   11.	Did the patient share eating or drinking utensils with them? (  ) Yes   (  ) No    (  ) Unknown- Reason: ____  12.	Did they sneeze, cough, or somehow get respiratory droplets on the patient? (  ) Yes   (  ) No    (  ) Unknown- Reason: ______  13.	*Has the patient been out of [place] within the past 10 days?* (  ) Yes   ( X) No   (  ) Unknown- Reason: ____  IF YES PLEASE ANSWER THE FOLLOWING QUESTIONS:  14.	Which state/country did they go to? _____  15.	Were they there over 24 hours? (  ) Yes   (  ) No    (  ) Unknown- Reason: ______  16.	Date of return to [facility]: ______.

## 2021-06-06 NOTE — ED BEHAVIORAL HEALTH ASSESSMENT NOTE - REFERRAL / APPOINTMENT DETAILS
has standing appointment with therapist, Dr. Esau Rodriguez on Monday 6/7/2021 at 1 PM which she intends to keep; discussed need to make new appointment with psychiatrist, Dr. Arias as patient reports having just recently seen him last week

## 2021-06-06 NOTE — ED PROVIDER NOTE - OBJECTIVE STATEMENT
60 y/o female with PMHx Depression BIBA from home for psychiatric evaluation. Pt reports she has been feeling sad today because her  will not allow her daughters boyfriend to enter their house though he is a good kid. Reports she drank two glasses of alcohol and took two Xanax. pt reports she was placed on xanax 2 years ago due to death of her father. Reports she was diagnosed with Depression around 40 years old. Discussed with patient , reports he called ambulance for psych eval. Reports she drank and took xanax in front of him. Reports he called because she was crying. reports he wasn't concerned she would kill herself. Reports no hx of harming self. Reports she threatened to harm herself with knife 1 month ago. Reports mother hx of depression as well. pt denies suicidal thoughts, chest pain, SOB, headache, vomiting, or any other complaints.

## 2022-07-12 ENCOUNTER — EMERGENCY (EMERGENCY)
Facility: HOSPITAL | Age: 62
LOS: 1 days | Discharge: HOME | End: 2022-07-12
Attending: EMERGENCY MEDICINE | Admitting: EMERGENCY MEDICINE
Payer: COMMERCIAL

## 2022-07-12 ENCOUNTER — APPOINTMENT (OUTPATIENT)
Dept: AFTER HOURS CARE | Facility: EMERGENCY ROOM | Age: 62
End: 2022-07-12

## 2022-07-12 ENCOUNTER — TRANSCRIPTION ENCOUNTER (OUTPATIENT)
Age: 62
End: 2022-07-12

## 2022-07-12 VITALS
DIASTOLIC BLOOD PRESSURE: 92 MMHG | OXYGEN SATURATION: 94 % | SYSTOLIC BLOOD PRESSURE: 149 MMHG | WEIGHT: 167.99 LBS | RESPIRATION RATE: 20 BRPM | TEMPERATURE: 101 F | HEIGHT: 61 IN | HEART RATE: 120 BPM

## 2022-07-12 VITALS
OXYGEN SATURATION: 98 % | RESPIRATION RATE: 29 BRPM | HEART RATE: 101 BPM | SYSTOLIC BLOOD PRESSURE: 154 MMHG | TEMPERATURE: 100 F | DIASTOLIC BLOOD PRESSURE: 81 MMHG

## 2022-07-12 DIAGNOSIS — U07.1 COVID-19: ICD-10-CM

## 2022-07-12 DIAGNOSIS — Z90.710 ACQUIRED ABSENCE OF BOTH CERVIX AND UTERUS: Chronic | ICD-10-CM

## 2022-07-12 LAB
ALBUMIN SERPL ELPH-MCNC: 4.1 G/DL — SIGNIFICANT CHANGE UP (ref 3.3–5)
ALP SERPL-CCNC: 74 U/L — SIGNIFICANT CHANGE UP (ref 30–120)
ALT FLD-CCNC: 28 U/L DA — SIGNIFICANT CHANGE UP (ref 10–60)
ANION GAP SERPL CALC-SCNC: 10 MMOL/L — SIGNIFICANT CHANGE UP (ref 5–17)
APTT BLD: 30.5 SEC — SIGNIFICANT CHANGE UP (ref 27.5–35.5)
AST SERPL-CCNC: 14 U/L — SIGNIFICANT CHANGE UP (ref 10–40)
BASOPHILS # BLD AUTO: 0.02 K/UL — SIGNIFICANT CHANGE UP (ref 0–0.2)
BASOPHILS NFR BLD AUTO: 0.2 % — SIGNIFICANT CHANGE UP (ref 0–2)
BILIRUB SERPL-MCNC: 0.4 MG/DL — SIGNIFICANT CHANGE UP (ref 0.2–1.2)
BUN SERPL-MCNC: 11 MG/DL — SIGNIFICANT CHANGE UP (ref 7–23)
CALCIUM SERPL-MCNC: 9.2 MG/DL — SIGNIFICANT CHANGE UP (ref 8.4–10.5)
CHLORIDE SERPL-SCNC: 105 MMOL/L — SIGNIFICANT CHANGE UP (ref 96–108)
CO2 SERPL-SCNC: 27 MMOL/L — SIGNIFICANT CHANGE UP (ref 22–31)
CREAT SERPL-MCNC: 0.99 MG/DL — SIGNIFICANT CHANGE UP (ref 0.5–1.3)
EGFR: 64 ML/MIN/1.73M2 — SIGNIFICANT CHANGE UP
EOSINOPHIL # BLD AUTO: 0.03 K/UL — SIGNIFICANT CHANGE UP (ref 0–0.5)
EOSINOPHIL NFR BLD AUTO: 0.3 % — SIGNIFICANT CHANGE UP (ref 0–6)
FLUAV AG NPH QL: SIGNIFICANT CHANGE UP
FLUBV AG NPH QL: SIGNIFICANT CHANGE UP
GLUCOSE SERPL-MCNC: 114 MG/DL — HIGH (ref 70–99)
HCT VFR BLD CALC: 41 % — SIGNIFICANT CHANGE UP (ref 34.5–45)
HGB BLD-MCNC: 13.3 G/DL — SIGNIFICANT CHANGE UP (ref 11.5–15.5)
IMM GRANULOCYTES NFR BLD AUTO: 0.5 % — SIGNIFICANT CHANGE UP (ref 0–1.5)
INR BLD: 1.09 RATIO — SIGNIFICANT CHANGE UP (ref 0.88–1.16)
LYMPHOCYTES # BLD AUTO: 1.22 K/UL — SIGNIFICANT CHANGE UP (ref 1–3.3)
LYMPHOCYTES # BLD AUTO: 13.9 % — SIGNIFICANT CHANGE UP (ref 13–44)
MCHC RBC-ENTMCNC: 28.1 PG — SIGNIFICANT CHANGE UP (ref 27–34)
MCHC RBC-ENTMCNC: 32.4 GM/DL — SIGNIFICANT CHANGE UP (ref 32–36)
MCV RBC AUTO: 86.5 FL — SIGNIFICANT CHANGE UP (ref 80–100)
MONOCYTES # BLD AUTO: 0.83 K/UL — SIGNIFICANT CHANGE UP (ref 0–0.9)
MONOCYTES NFR BLD AUTO: 9.5 % — SIGNIFICANT CHANGE UP (ref 2–14)
NEUTROPHILS # BLD AUTO: 6.63 K/UL — SIGNIFICANT CHANGE UP (ref 1.8–7.4)
NEUTROPHILS NFR BLD AUTO: 75.6 % — SIGNIFICANT CHANGE UP (ref 43–77)
NRBC # BLD: 0 /100 WBCS — SIGNIFICANT CHANGE UP (ref 0–0)
PLATELET # BLD AUTO: 243 K/UL — SIGNIFICANT CHANGE UP (ref 150–400)
POTASSIUM SERPL-MCNC: 3.5 MMOL/L — SIGNIFICANT CHANGE UP (ref 3.5–5.3)
POTASSIUM SERPL-SCNC: 3.5 MMOL/L — SIGNIFICANT CHANGE UP (ref 3.5–5.3)
PROT SERPL-MCNC: 7.8 G/DL — SIGNIFICANT CHANGE UP (ref 6–8.3)
PROTHROM AB SERPL-ACNC: 12.9 SEC — SIGNIFICANT CHANGE UP (ref 10.5–13.4)
RBC # BLD: 4.74 M/UL — SIGNIFICANT CHANGE UP (ref 3.8–5.2)
RBC # FLD: 13.3 % — SIGNIFICANT CHANGE UP (ref 10.3–14.5)
RSV RNA NPH QL NAA+NON-PROBE: SIGNIFICANT CHANGE UP
SARS-COV-2 RNA SPEC QL NAA+PROBE: DETECTED
SODIUM SERPL-SCNC: 142 MMOL/L — SIGNIFICANT CHANGE UP (ref 135–145)
WBC # BLD: 8.77 K/UL — SIGNIFICANT CHANGE UP (ref 3.8–10.5)
WBC # FLD AUTO: 8.77 K/UL — SIGNIFICANT CHANGE UP (ref 3.8–10.5)

## 2022-07-12 PROCEDURE — 85610 PROTHROMBIN TIME: CPT

## 2022-07-12 PROCEDURE — 71045 X-RAY EXAM CHEST 1 VIEW: CPT

## 2022-07-12 PROCEDURE — 80053 COMPREHEN METABOLIC PANEL: CPT

## 2022-07-12 PROCEDURE — 99284 EMERGENCY DEPT VISIT MOD MDM: CPT | Mod: 25

## 2022-07-12 PROCEDURE — 99204 OFFICE O/P NEW MOD 45 MIN: CPT | Mod: NC,95

## 2022-07-12 PROCEDURE — 85730 THROMBOPLASTIN TIME PARTIAL: CPT

## 2022-07-12 PROCEDURE — 94640 AIRWAY INHALATION TREATMENT: CPT

## 2022-07-12 PROCEDURE — 94664 DEMO&/EVAL PT USE INHALER: CPT

## 2022-07-12 PROCEDURE — 36415 COLL VENOUS BLD VENIPUNCTURE: CPT

## 2022-07-12 PROCEDURE — 87637 SARSCOV2&INF A&B&RSV AMP PRB: CPT

## 2022-07-12 PROCEDURE — 96360 HYDRATION IV INFUSION INIT: CPT

## 2022-07-12 PROCEDURE — 99285 EMERGENCY DEPT VISIT HI MDM: CPT

## 2022-07-12 PROCEDURE — 71045 X-RAY EXAM CHEST 1 VIEW: CPT | Mod: 26

## 2022-07-12 PROCEDURE — 85025 COMPLETE CBC W/AUTO DIFF WBC: CPT

## 2022-07-12 RX ORDER — SODIUM CHLORIDE 9 MG/ML
1000 INJECTION INTRAMUSCULAR; INTRAVENOUS; SUBCUTANEOUS ONCE
Refills: 0 | Status: COMPLETED | OUTPATIENT
Start: 2022-07-12 | End: 2022-07-12

## 2022-07-12 RX ORDER — NIRMATRELVIR AND RITONAVIR 300-100 MG
20 X 150 MG & KIT ORAL
Qty: 1 | Refills: 0 | Status: DISCONTINUED | COMMUNITY
Start: 2022-07-12 | End: 2022-07-12

## 2022-07-12 RX ORDER — ACETAMINOPHEN 500 MG
650 TABLET ORAL ONCE
Refills: 0 | Status: COMPLETED | OUTPATIENT
Start: 2022-07-12 | End: 2022-07-12

## 2022-07-12 RX ORDER — IPRATROPIUM/ALBUTEROL SULFATE 18-103MCG
3 AEROSOL WITH ADAPTER (GRAM) INHALATION ONCE
Refills: 0 | Status: COMPLETED | OUTPATIENT
Start: 2022-07-12 | End: 2022-07-12

## 2022-07-12 RX ADMIN — Medication 3 MILLILITER(S): at 15:29

## 2022-07-12 RX ADMIN — SODIUM CHLORIDE 1000 MILLILITER(S): 9 INJECTION INTRAMUSCULAR; INTRAVENOUS; SUBCUTANEOUS at 15:46

## 2022-07-12 RX ADMIN — SODIUM CHLORIDE 1000 MILLILITER(S): 9 INJECTION INTRAMUSCULAR; INTRAVENOUS; SUBCUTANEOUS at 16:44

## 2022-07-12 RX ADMIN — Medication 650 MILLIGRAM(S): at 15:30

## 2022-07-12 RX ADMIN — Medication 650 MILLIGRAM(S): at 16:30

## 2022-07-12 NOTE — ED PROVIDER NOTE - NSFOLLOWUPINSTRUCTIONS_ED_ALL_ED_FT
Follow up with your PMD for re-evaluation, ongoing care and treatment. Follow up for your monoclonal antibody infusion tomorrow. Rest, take tylenol as directed for fever/pain, drink plenty of fluids, use pulse oximeter as directed. If having worsening of symptoms, shortness of breath, chest pain or other related symptoms, RETURN TO THE ER IMMEDIATELY.

## 2022-07-12 NOTE — ED PROVIDER NOTE - PROGRESS NOTE DETAILS
Reevaluated patient at bedside.  Patient feeling much improved. Has appt for MAB tomorrow. Discussed the results of all diagnostic testing in ED and copies of all reports given.   An opportunity to ask questions was given.  Discussed the importance of prompt, close medical follow-up.  Patient will return with any changes, concerns or persistent / worsening symptoms.  Understanding of all instructions verbalized.

## 2022-07-12 NOTE — PHYSICAL EXAM
[No Acute Distress] : no acute distress [Well-Appearing] : well-appearing [Normal Sclera/Conjunctiva] : normal sclera/conjunctiva [No Respiratory Distress] : no respiratory distress  [de-identified] : EXAM: nad, speaking in full sentences, no tachypnea or dyspnea with speaking

## 2022-07-12 NOTE — ED ADULT NURSE NOTE - NSICDXPASTSURGICALHX_GEN_ALL_CORE_FT
PAST SURGICAL HISTORY:  H/O  section     S/P bilateral breast reduction 2007    S/P MAMI-BSO 2012

## 2022-07-12 NOTE — CONSULT NOTE ADULT - SUBJECTIVE AND OBJECTIVE BOX
Date/Time Patient Seen:  		  Referring MD:   Data Reviewed	       Patient is a 62y old  Female who presents with a chief complaint of     Subjective/HPI  vs noted  labs reviewed  er provider note reviewed  imaging reviewed  covid pos  pt has hx of Asthma  follows with Dr Murillo in the office  cough - weakness -   denies sick contacts    lives at home       PAST MEDICAL/SURGICAL/FAMILY/SOCIAL HISTORY:    Past Medical, Past Surgical, and Family History:  PAST MEDICAL HISTORY:  ACL tear current left. No surgery    Asthma     Bilateral ovarian cysts '   surgically treated    Corneal abrasion left     Depression medically managed. No hospitalizations    Environmental Allergies dust, cats, dogs, mold, trees    Herniated disc cervical and lumbarsacral    Leukocytosis secondary to previous long-term sterod use for treatment Asthma and Lumbar pain.     PAST SURGICAL HISTORY:  H/O  section 2000    S/P bilateral breast reduction '     S/P MAMI-BSO 2012.  PAST MEDICAL & SURGICAL HISTORY:  Asthma    Depression  medically managed. No hospitalizations    Laryngitis  finished z pack 3/10/12    Bilateral ovarian cysts  &#x27; 12  surgically treated    ACL tear  current left. No surgery    Bursitis  right, s/p cortisone injection 3 weeks ago    Corneal abrasion  left     Herniated disc  cervical and lumbarsacral    Environmental Allergies  dust, cats, dogs, mold, trees    Leukocytosis  secondary to previous long-term sterod use for treatment Asthma and Lumbar pain.    H/O  section  2000    S/P bilateral breast reduction  &#x27;     S/P MAMI-BSO  &#x27; 2012     Past Surgical History:  H/O  section  2000  S/P bilateral breast reduction  '   S/P MAMI-BSO  2012.     Tobacco Usage:  · Tobacco Usage	Never smoker    ALLERGIES AND HOME MEDICATIONS:   Allergies:        Allergies:  	clindamycin: Drug, Patient, Velásquez-Juan M  	Neosporin: Drug, Rash, Topical  	surgical tape: Miscellaneous, Swelling, Rash, surgical tape       Intolerances:  	Percocet 10/325: Drug, Confirmed, Patient, Stomach Upset      Medication list         MEDICATIONS  (STANDING):    MEDICATIONS  (PRN):         Vitals log        ICU Vital Signs Last 24 Hrs  T(C): 38.3 (2022 14:33), Max: 38.3 (2022 14:33)  T(F): 101 (2022 14:33), Max: 101 (2022 14:33)  HR: 104 (2022 15:29) (99 - 120)  BP: 149/92 (2022 14:33) (149/92 - 149/92)  BP(mean): --  ABP: --  ABP(mean): --  RR: 20 (2022 15:29) (20 - 20)  SpO2: 95% (2022 15:29) (94% - 95%)    O2 Parameters below as of 2022 15:29  Patient On (Oxygen Delivery Method): room air                 Input and Output:  I&O's Detail      Lab Data                        13.3   8.77  )-----------( 243      ( 2022 15:12 )             41.0     07-12    142  |  105  |  11  ----------------------------<  114<H>  3.5   |  27  |  0.99    Ca    9.2      2022 15:12    TPro  7.8  /  Alb  4.1  /  TBili  0.4  /  DBili  x   /  AST  14  /  ALT  28  /  AlkPhos  74  07-12       COVID-19 Vaccine Assessment:  · History of COVID-19 vaccination	Yes  · Brand of COVID-19 vaccination	Pfizer dose 1, 2, and 3  · Date of last vaccination	2021  · Have you had a first COVID-19 booster?	Yes  · Brand of first COVID-19 booster	Pfizer  · Date of first COVID-19 booster	11-Oct-2021  · Have you had a second COVID-19 booster?	No  · Will the patient accept the COVID-19 vaccine if eligible and it is available?	No        Review of Systems	  weakness  cough      Objective     Physical Examination    heart s1s2  lung dec BS  abd soft  head nc      Pertinent Lab findings & Imaging      Thomas:  NO   Adequate UO     I&O's Detail           Discussed with:     Cultures:	        Radiology          Ventricular Rate 77 BPM    Atrial Rate 77 BPM    P-R Interval 146 ms    QRS Duration 66 ms    Q-T Interval 394 ms    QTC Calculation(Bazett) 445 ms    P Axis 56 degrees    R Axis -7 degrees    T Axis 23 degrees    Diagnosis Line Normal sinus rhythm  Normal ECG  When compared with ECG of 2021 14:04, (Unconfirmed)  No significant change was found  Confirmed by MD JUWAN, RASHI ([id number]) on 2021 12:17:00 PM

## 2022-07-12 NOTE — ED ADULT NURSE NOTE - OBJECTIVE STATEMENT
Received a 61 y/o female, came to ED presenting cough since last night associated with fever and chills. Pt is A&O X4, color appears normal, skin warm to touch.  Denies any SOB, nausea, vomiting as of this time of assessment. Pt reports episodes of watery stools X 3. Abdomen soft, non-distended. Respiration regular, both lungs clear to auscultate. Pt states her home COVID-19 test was positive (+). Denies any recent travel, exposure/sick contact. Pt place on airborne/contact/eye protection isolation. Seen/attended by ED providers with orders reviewed and noted. Blood drawn and sent to lab. IV david G20 inserted to left AC after 1st attempt. Place on cardiac monitoring as per ED protocol. Encourage to make needs known to staff and report any concerning symptoms. Safety measures observed. Call bell within reach. Continue to observed and anticipate  needfs.

## 2022-07-12 NOTE — ED PROVIDER NOTE - NSICDXPASTSURGICALHX_GEN_ALL_CORE_FT
PAST SURGICAL HISTORY:  H/O  section     S/P bilateral breast reduction 2007    S/P MAMI-BSO 2012

## 2022-07-12 NOTE — ED ADULT NURSE NOTE - NSICDXPASTMEDICALHX_GEN_ALL_CORE_FT
PAST MEDICAL HISTORY:  ACL tear current left. No surgery    Asthma     Bilateral ovarian cysts ' 12  surgically treated    Corneal abrasion left 2000    Depression medically managed. No hospitalizations    Environmental Allergies dust, cats, dogs, mold, trees    Herniated disc cervical and lumbarsacral    Leukocytosis secondary to previous long-term sterod use for treatment Asthma and Lumbar pain.

## 2022-07-12 NOTE — ASSESSMENT
[FreeTextEntry1] : 62yoF; with PMH signif for asthma, depression, BMI=30; now p/w covid (day1).  \par -Reviewed eligibility criteria and contraindication list.  patient eligible and offered Paxlovid\par -monitor oxygen with pulse oximeter\par -isolation recommendations reviewed\par -symptomatic/supportive care for covid: tylenol/motrin prn, prone sleeping, po hydration, monitor pulse ox\par -given precautions to seek immediate care in ED/call 911 if any chest pain, severe shortness of breath, SpO2</=92%, or any other acute causes of concern.\par

## 2022-07-12 NOTE — ED PROVIDER NOTE - NS ED ATTENDING STATEMENT MOD
This was a shared visit with the REGINALD. I reviewed and verified the documentation and independently performed the documented:

## 2022-07-12 NOTE — ED PROVIDER NOTE - OBJECTIVE STATEMENT
63 y/o F with PMHx of asthma, depression, herniated disc, leukocytosis, ovarian cysts presents with c/o cough, body aches, diarrhea and fever since last night. Pt took a home COVID-19 yesterday and it was positive. Pt had a teleheath visit with a [facility] doctor today and was referred for a MAB infusion which is scheduled for tomorrow. Pt came to the ED today because she saw her O2 was 88% on home pulse ox and wanted to make sure she was okay. Denies chest pain, sob, abdominal pain, calf pain, vomiting or other symptoms.

## 2022-07-12 NOTE — ADDENDUM
[FreeTextEntry1] : 7/12/22 @12pm:  Upon further review of medication list, patient has contraindication to Paxlovid.  Will enroll for mab. patient contacted and updated.\par 7/12/22 @1400:  patient called back after obtaining a pulse oximeter and states her oxygen saturation is 88%.  patient instructed to proceed immediately to hospital or call 911.  she agrees to seek immediate assistance.

## 2022-07-12 NOTE — ED PROVIDER NOTE - CLINICAL SUMMARY MEDICAL DECISION MAKING FREE TEXT BOX
61 y/o female with a PMHx of asthma, depression, herniated disc, leukocytosis, ovarian cysts presents to the ED c/o cough, body aches, and fever since last night. Pt took a home COVID-19 today and it resulted positive. Pt had a teleheath visit with a [facility] doctor today and was referred for a MAB infusion which is scheduled for tomorrow. Pt came to the ED today because she saw her O2 was 88% on home pulse ox and wanted to make sure she was okay.  Plan: CXR, labs, Tylenol, IV fluids, and Duoneb 63 y/o female with a PMHx of asthma, depression, herniated disc, leukocytosis, ovarian cysts presents to the ED c/o cough, body aches, and fever since last night. Pt took a home COVID-19 today and it resulted positive. Pt had a teleheath visit with a [facility] doctor today and was referred for a MAB infusion which is scheduled for tomorrow. Pt came to the ED today because she saw her O2 was 88% on home pulse ox and wanted to make sure she was okay. no chest pain, sob, abd pain, edema, calf pain  Plan: CXR, labs, Tylenol, IV fluids, and Duoneb

## 2022-07-12 NOTE — CONSULT NOTE ADULT - ASSESSMENT
62y Female complaining of cough.    covid  weakness  viral syndrome  asthma hx  obesity    vaccinated  acute viral syndrome due to covid   tylenol prn  robitussin prn  cont asthma rx regimen as per home rx regimen - singulair - bronchodilators  on room air - no indication for Decadron - however - may benefit from Systemic Steroid taper due to history of Asthma  cxr - prelim view - no acute pulm path  hydration - rest - enc PO intake  follow up in the office with Dr Murillo - pt's PMD Pulmonary  consideration for PAXLOVID

## 2022-07-12 NOTE — PLAN
[With new medications prescribed] : Treat in place: with new medications prescribed [FreeTextEntry1] : 62yoF; with PMH signif for asthma, depression, BMI=30; now p/w covid (day1).  \par -Reviewed eligibility criteria and contraindication list.  patient eligible and offered Paxlovid\par -monitor oxygen with pulse oximeter\par -isolation recommendations reviewed\par -symptomatic/supportive care for covid: tylenol/motrin prn, prone sleeping, po hydration, monitor pulse ox\par -given precautions to seek immediate care in ED/call 911 if any chest pain, severe shortness of breath, SpO2</=92%, or any other acute causes of concern.

## 2022-07-12 NOTE — REVIEW OF SYSTEMS
[Fever] : fever [Chills] : chills [Fatigue] : fatigue [Cough] : cough [Chest Pain] : no chest pain [Shortness Of Breath] : no shortness of breath [Abdominal Pain] : no abdominal pain [Nausea] : no nausea [Vomiting] : no vomiting [Headache] : no headache [Dizziness] : no dizziness

## 2022-07-12 NOTE — ED PROVIDER NOTE - PATIENT PORTAL LINK FT
You can access the FollowMyHealth Patient Portal offered by [facility] by registering at the following website: [email or web address]. By joining [facility]’s FollowMyHealth portal, you will also be able to view your health information using other applications (apps) compatible with our system.

## 2022-07-12 NOTE — HISTORY OF PRESENT ILLNESS
[Home] : at home, [unfilled] , at the time of the visit. [Other Location: e.g. Home (Enter Location, City,State)___] : at [unfilled] [Verbal consent obtained from patient] : the patient, [unfilled] [FreeTextEntry8] : 62yoF; with PMH signif for Asthma, Depression, BMI 30; now p/w covid. patient reports beginning to have fever, chills, fatigue, and cough [id number]. denies n/v. denies headache. denies cp/sob. denies dizziness. took Nyquil at home and mucinex with no relief.  \par PMH: Asthma, Depression, hx of encephalitis\par SOCIAL: non-smoker\par

## 2022-07-14 ENCOUNTER — APPOINTMENT (OUTPATIENT)
Age: 62
End: 2022-07-14

## 2022-07-14 ENCOUNTER — EMERGENCY (EMERGENCY)
Facility: HOSPITAL | Age: 62
LOS: 1 days | Discharge: HOME | End: 2022-07-14
Admitting: EMERGENCY MEDICINE
Payer: COMMERCIAL

## 2022-07-14 VITALS
SYSTOLIC BLOOD PRESSURE: 141 MMHG | OXYGEN SATURATION: 97 % | HEART RATE: 98 BPM | TEMPERATURE: 98 F | RESPIRATION RATE: 18 BRPM | HEIGHT: 61 IN | DIASTOLIC BLOOD PRESSURE: 86 MMHG

## 2022-07-14 VITALS
SYSTOLIC BLOOD PRESSURE: 132 MMHG | OXYGEN SATURATION: 97 % | DIASTOLIC BLOOD PRESSURE: 80 MMHG | RESPIRATION RATE: 18 BRPM | HEART RATE: 90 BPM | TEMPERATURE: 99 F

## 2022-07-14 DIAGNOSIS — Z90.710 ACQUIRED ABSENCE OF BOTH CERVIX AND UTERUS: Chronic | ICD-10-CM

## 2022-07-14 PROCEDURE — L9998: CPT | Mod: 1L

## 2022-07-14 RX ORDER — BEBTELOVIMAB 87.5 MG/ML
175 INJECTION, SOLUTION INTRAVENOUS ONCE
Refills: 0 | Status: COMPLETED | OUTPATIENT
Start: 2022-07-14 | End: 2022-07-14

## 2022-07-14 RX ADMIN — BEBTELOVIMAB 175 MILLIGRAM(S): 87.5 INJECTION, SOLUTION INTRAVENOUS at 14:24

## 2022-07-14 NOTE — CHART NOTE - NSCHARTNOTEFT_GEN_A_CORE
Monoclonal Antibody Infusion:    Date of (+) test: 7/12/22    Allergies: Clindamycin (rash/hives), Neosporin/Bacitracin (hives/rash), adhesive tape (hives)    PMH: Hx of Retinal detachment, Asthma, Anxiety depression     Home Medications: Servaline, Xanax PRN, Singulair, Albuterol PRN, Qvar       PE:    Appearance: NAD    HEENT:   Normal oral mucosa Lymphatic: No lymphadenopathy    Cardiovascular: Normal S1 S2, no acute cardiac distress noted.    Respiratory: Lungs clear to auscultation    Gastrointestinal:  Soft, Non-tender, + BS    Skin: Warm and dry    Neurologic: Non-focal    Extremities: Normal range of motion      Vital Signs: see flowsheets       Assessment:  Bebtelovimab infusion Emergency Use Authorization (EUA) and I have provided the patient or patient's caregiver with the following information:       1. FDA has authorized emergency use Bebtelovimab which is not an FDA-approved biological product.    2. The patient or patient's caregiver has the option to accept or refuse administration of  Bebtelovimab     3. The significant known and potential risks and benefits of  Bebtelovimab and the extent to which such risks and benefits are unknown.    4. Information on available alternative treatments and risks and benefits of those alternatives.      PLAN: - Infusion procedure explained to patient - Consent for monoclonal antibody infusion obtained - Risk & benefits discussed/all questions answered - Infuse Bebtelovimab IVP - Observe patient for one hour post infusion and discharge home    -Consent form thoroughly reviewed and signed.  -All questions answered.  -Pt is okay with the plan.  -Pt tolerated MAB infusion well.  -No acute distress, noted.  -Pt advised to follow-up with the PCP soon.  -ER precautions, if symptoms worsen. Monoclonal Antibody Infusion:    Date of (+) test: 7/12/22    Allergies: Clindamycin (rash/hives), Neosporin/Bacitracin (hives/rash), adhesive tape (hives)    PMH: Hx of Retinal detachment, Asthma, Anxiety depression     Home Medications: Sertraline, Xanax PRN, Singulair, Albuterol PRN, Qvar       PE:    Appearance: NAD    HEENT:   Normal oral mucosa Lymphatic: No lymphadenopathy    Cardiovascular: Normal S1 S2, no acute cardiac distress noted.    Respiratory: Lungs clear to auscultation    Gastrointestinal:  Soft, Non-tender, + BS    Skin: Warm and dry    Neurologic: Non-focal    Extremities: Normal range of motion      Vital Signs: see flowsheets       Assessment:  Bebtelovimab infusion Emergency Use Authorization (EUA) and I have provided the patient or patient's caregiver with the following information:       1. FDA has authorized emergency use Bebtelovimab which is not an FDA-approved biological product.    2. The patient or patient's caregiver has the option to accept or refuse administration of  Bebtelovimab     3. The significant known and potential risks and benefits of  Bebtelovimab and the extent to which such risks and benefits are unknown.    4. Information on available alternative treatments and risks and benefits of those alternatives.      PLAN: - Infusion procedure explained to patient - Consent for monoclonal antibody infusion obtained - Risk & benefits discussed/all questions answered - Infuse Bebtelovimab IVP - Observe patient for one hour post infusion and discharge home    -Consent form thoroughly reviewed and signed.  -All questions answered.  -Pt is okay with the plan.  -Pt tolerated MAB infusion well.  -No acute distress, noted.  -Pt advised to follow-up with the PCP soon.  -ER precautions, if symptoms worsen.

## 2022-07-18 DIAGNOSIS — J45.909 UNSPECIFIED ASTHMA, UNCOMPLICATED: ICD-10-CM

## 2022-07-18 DIAGNOSIS — F41.8 OTHER SPECIFIED ANXIETY DISORDERS: ICD-10-CM

## 2022-07-18 DIAGNOSIS — U07.1 COVID-19: ICD-10-CM

## 2022-07-18 DIAGNOSIS — R50.9 FEVER, UNSPECIFIED: ICD-10-CM

## 2023-12-27 ENCOUNTER — APPOINTMENT (OUTPATIENT)
Dept: NEUROLOGY | Facility: CLINIC | Age: 63
End: 2023-12-27

## 2024-09-30 NOTE — ED PROVIDER NOTE - NS_ATTENDINGSCRIBE_ED_ALL_ED
Xray appears negative for any fracture. Will follow up with radiologist report when available. Recommend elevating body part, icing the area every 2 hours for 20-30 minutes, take Ibuprofen every 6-8 hours to reduce inflammation.  If not improving over the next week, follow up with PCP or orthopedics.       I personally performed the service described in the documentation recorded by the scribe in my presence, and it accurately and completely records my words and actions.

## 2024-10-07 ENCOUNTER — NON-APPOINTMENT (OUTPATIENT)
Age: 64
End: 2024-10-07

## 2024-10-07 DIAGNOSIS — E55.9 VITAMIN D DEFICIENCY, UNSPECIFIED: ICD-10-CM

## 2024-10-07 DIAGNOSIS — Z78.9 OTHER SPECIFIED HEALTH STATUS: ICD-10-CM

## 2024-10-07 DIAGNOSIS — J45.909 UNSPECIFIED ASTHMA, UNCOMPLICATED: ICD-10-CM
